# Patient Record
Sex: FEMALE | Race: WHITE | Employment: OTHER | URBAN - METROPOLITAN AREA
[De-identification: names, ages, dates, MRNs, and addresses within clinical notes are randomized per-mention and may not be internally consistent; named-entity substitution may affect disease eponyms.]

---

## 2021-01-05 ENCOUNTER — APPOINTMENT (OUTPATIENT)
Dept: CT IMAGING | Age: 86
End: 2021-01-05
Payer: MEDICARE

## 2021-01-05 ENCOUNTER — HOSPITAL ENCOUNTER (EMERGENCY)
Age: 86
Discharge: HOME | End: 2021-01-05
Attending: EMERGENCY MEDICINE
Payer: MEDICARE

## 2021-01-05 VITALS
HEART RATE: 66 BPM | OXYGEN SATURATION: 98 % | SYSTOLIC BLOOD PRESSURE: 162 MMHG | HEIGHT: 59 IN | DIASTOLIC BLOOD PRESSURE: 76 MMHG | RESPIRATION RATE: 16 BRPM | BODY MASS INDEX: 19.33 KG/M2 | WEIGHT: 95.9 LBS | TEMPERATURE: 97.9 F

## 2021-01-05 DIAGNOSIS — S01.81XA FACIAL LACERATION, INITIAL ENCOUNTER: Primary | ICD-10-CM

## 2021-01-05 DIAGNOSIS — R41.82 ALTERED MENTAL STATUS, UNSPECIFIED ALTERED MENTAL STATUS TYPE: ICD-10-CM
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
## 2021-01-05 LAB
GLUCOSE BLD-MCNC: 141 MG/DL (ref 70–99)
PERFORMED ON: ABNORMAL

## 2021-01-05 PROCEDURE — 70450 CT HEAD/BRAIN W/O DYE: CPT

## 2021-01-05 PROCEDURE — 12011 RPR F/E/E/N/L/M 2.5 CM/<: CPT

## 2021-01-05 PROCEDURE — 6370000000 HC RX 637 (ALT 250 FOR IP): Performed by: EMERGENCY MEDICINE

## 2021-01-05 PROCEDURE — 6360000002 HC RX W HCPCS: Performed by: EMERGENCY MEDICINE

## 2021-01-05 PROCEDURE — 99284 EMERGENCY DEPT VISIT MOD MDM: CPT

## 2021-01-05 PROCEDURE — 72125 CT NECK SPINE W/O DYE: CPT

## 2021-01-05 PROCEDURE — 90471 IMMUNIZATION ADMIN: CPT | Performed by: EMERGENCY MEDICINE

## 2021-01-05 PROCEDURE — 90715 TDAP VACCINE 7 YRS/> IM: CPT | Performed by: EMERGENCY MEDICINE

## 2021-01-05 RX ORDER — CEPHALEXIN 500 MG/1
500 CAPSULE ORAL 2 TIMES DAILY
Qty: 6 CAPSULE | Refills: 0 | Status: SHIPPED | OUTPATIENT
Start: 2021-01-05 | End: 2021-01-08

## 2021-01-05 RX ORDER — CEPHALEXIN 500 MG/1
500 CAPSULE ORAL ONCE
Status: COMPLETED | OUTPATIENT
Start: 2021-01-05 | End: 2021-01-05

## 2021-01-05 RX ADMIN — TETANUS TOXOID, REDUCED DIPHTHERIA TOXOID AND ACELLULAR PERTUSSIS VACCINE, ADSORBED 0.5 ML: 5; 2.5; 8; 8; 2.5 SUSPENSION INTRAMUSCULAR at 13:19

## 2021-01-05 RX ADMIN — CEPHALEXIN 500 MG: 500 CAPSULE ORAL at 13:18

## 2021-01-05 ASSESSMENT — PAIN DESCRIPTION - PAIN TYPE: TYPE: ACUTE PAIN

## 2021-01-05 ASSESSMENT — PAIN DESCRIPTION - DESCRIPTORS: DESCRIPTORS: ACHING

## 2021-01-05 ASSESSMENT — PAIN DESCRIPTION - ONSET: ONSET: SUDDEN

## 2021-01-05 ASSESSMENT — PAIN DESCRIPTION - FREQUENCY: FREQUENCY: CONTINUOUS

## 2021-01-05 ASSESSMENT — PAIN DESCRIPTION - LOCATION: LOCATION: HEAD

## 2021-01-05 ASSESSMENT — PAIN SCALES - GENERAL: PAINLEVEL_OUTOF10: 2

## 2021-01-05 NOTE — ED PROVIDER NOTES
The patient was seen primarily by Dr. Carolina Cruz, but I performed the laceration repair procedure as outlined below. Lac Repair    Date/Time: 1/5/2021 2:04 PM  Performed by: Allyssa Rashid PA-C  Authorized by: Charles Negrete MD     Consent:     Consent obtained:  Verbal    Consent given by:  Patient    Risks discussed:  Poor wound healing  Anesthesia (see MAR for exact dosages): Anesthesia method:  Local infiltration    Local anesthetic:  Lidocaine 2% WITH epi  Laceration details:     Location:  Face    Face location:  Forehead    Length (cm):  2  Repair type:     Repair type:  Simple  Pre-procedure details:     Preparation:  Patient was prepped and draped in usual sterile fashion  Exploration:     Hemostasis achieved with:  Direct pressure and epinephrine    Wound exploration: wound explored through full range of motion and entire depth of wound probed and visualized      Wound extent: no foreign bodies/material noted    Treatment:     Area cleansed with:  Saline    Amount of cleaning:  Standard  Skin repair:     Repair method:  Sutures    Suture size:  6-0    Suture material:  Nylon    Suture technique:  Simple interrupted    Number of sutures:  4  Approximation:     Approximation:  Close  Post-procedure details:     Dressing:  Sterile dressing    Patient tolerance of procedure:   Tolerated well, no immediate complications           Allyssa Rashid PA-C  01/05/21 4901

## 2021-01-05 NOTE — ED NOTES
Discharge and education instructions reviewed. Patient verbalized understanding, teach-back successful. Patient denied questions at this time. No acute distress noted. Patient instructed to follow-up as noted - return to emergency department if symptoms worsen. Patient verbalized understanding. Discharged per EDMD with discharge instructions.         Lennox Kamara RN  01/05/21 1557

## 2021-01-05 NOTE — ED NOTES
Pt to ED with her son who is also medical POA and caregiver s/p fall. Fall was sometime last night, unwitnessed, pt had several episodes of emesis this morning. Pt is a hospice pt, son states hospice nurse saw her this morning and recommended pt be evaluated in ED. Laceration and bruising lateral to right eye. Pt alert and oriented to person which is her baseline mental status per son.        Segun Natarajan RN  01/05/21 8499

## 2021-01-05 NOTE — ED PROVIDER NOTES
Triage Chief Complaint:   Fall (Pt to ED with her son s/p fall. Fall was sometime last night, unwitnessed, had several episodes of emesis this morning. Laceration and bruising lateral to right eye.  ), Head Injury, and Emesis    [place]:  Tonette Angelucci is a 80 y.o. female that presents with son for evaluation of fall with head strike which occurred yesterday and now the patient is vomiting. She is hospice and son/MDM Denver Radish is at bedside and states she is DNR but home nurse was concerned for internal brain bleeding from fall now that she vomited. He states that other than evaluation of this they want no further workup and would like to to be DC'd if no acute traumatic injury. She arrives alert awake oriented denying pain to any extremities even denying headache although she does have a lac to the side of her head    ROS:  At least 12 systems reviewed and otherwise acutely negative except as in the [address] Past Medical History:   Diagnosis Date    Arthritis      Past Surgical History:   Procedure Laterality Date    HIP ARTHROPLASTY      JOINT REPLACEMENT      bilateral hip     History reviewed. No pertinent family history. Social History     Socioeconomic History    Marital status:       Spouse name: Not on file    Number of children: 6    Years of education: Not on file    Highest education level: Not on file   Occupational History    Occupation: retired   Social Needs    Financial resource strain: Not on file    Food insecurity     Worry: Not on file     Inability: Not on file   [employer] needs     Medical: Not on file     Non-medical: Not on file   Tobacco Use    Smoking status: Never Smoker    Smokeless tobacco: Never Used   Substance and Sexual Activity    Alcohol use: No    Drug use: No    Sexual activity: Not Currently   Lifestyle    Physical activity     Days per week: Not on file     Minutes per session: Not on file    Stress: Not on file   Relationships  Social connections     Talks on phone: Not on file     Gets together: Not on file     Attends Temple service: Not on file     Active member of club or organization: Not on file     Attends meetings of clubs or organizations: Not on file     Relationship status: Not on file    Intimate partner violence     Fear of current or ex partner: Not on file     Emotionally abused: Not on file     Physically abused: Not on file     Forced sexual activity: Not on file   Other Topics Concern    Not on file   Social History Narrative    Not on file     No current facility-administered medications for this encounter. Current Outpatient Medications   Medication Sig Dispense Refill    cephALEXin (KEFLEX) 500 MG capsule Take 1 capsule by mouth 2 times daily for 3 days 6 capsule 0    amlodipine (NORVASC) 10 MG tablet       ARTHROTEC 75  MG-MCG per tablet       lisinopril (PRINIVIL;ZESTRIL) 5 MG tablet       ACTONEL 35 MG tablet       simvastatin (ZOCOR) 80 MG tablet       hydrocodone-acetaminophen (VICODIN) 5-500 MG per tablet Take 1 tablet by mouth every 6 hours as needed for Pain. 30 tablet 0    Diclofenac-Misoprostol (ARTHROTEC 75 PO) Take  by mouth 2 times daily.  AmLODIPine Besylate (NORVASC PO) Take  by mouth daily.  Multiple Minerals-Vitamins (PROSTEON PO) Take  by mouth daily.  Multiple Vitamin (MULTIVITAMIN PO) Take  by mouth daily.  UNKNOWN TO PATIENT daily. Water pill        No Known Allergies    [unfilled]    Nursing Notes Reviewed    Physical Exam:  Vitals:    01/05/21 1530   BP: (!) 162/76   Pulse: 66   Resp: 16   Temp: 97.9 °F (36.6 °C)   SpO2: 98%       Physical Exam   Constitutional: Patient is oriented to person, place, and time; appears well-developed and well-nourished. Non-toxic appearance. No distress.    HENT:   Head: Normocephalic, bruising and laceration to right temple Eyes: Conjunctivae and EOM are normal. Pupils are equal, round, and reactive to light. No scleral icterus. Neck: Full passive range of motion without pain. Neck supple. No spinous process tenderness and no muscular tenderness present. Cardiovascular: Normal rate and regular rhythm. Exam reveals no gallop and no friction rub. All extremities NVI  No murmur heard. Pulmonary/Chest: Effort normal and breath sounds normal. No respiratory distress. No crepitus to chest   Abdominal: Soft. Normal appearance. No distension and no mass. There is no tenderness. There is no rigidity. Musculoskeletal:        Right/Left shoulder: Normal.        Right/Left elbow: Normal.       Right/Left wrist: Normal.        Right/Left hip:Normal       Right/Left knee: Normal.        Right/Left ankle: Normal.        Right/Left foot: Normal.   Neurological: Alert and oriented to person, place, and time. Skin: Skin is warm and dry. No rash noted. Lac to right temple  Psychiatric: Normal mood and affect.        I have reviewed and interpreted all of the currently available lab results from this visit (if applicable):  Results for orders placed or performed during the hospital encounter of 01/05/21   POCT Glucose   Result Value Ref Range    POC Glucose 141 (H) 70 - 99 mg/dl    Performed on ACCU-CHEK         Radiographs (if obtained):  [] The following radiograph was interpreted by myself in the absence of a radiologist:  [x] Radiologist's Report Reviewed:    Narrative   EXAMINATION:   CT OF THE HEAD WITHOUT CONTRAST  1/5/2021 12:53 pm       TECHNIQUE:   CT of the head was performed without the administration of intravenous   contrast. Dose modulation, iterative reconstruction, and/or weight based   adjustment of the mA/kV was utilized to reduce the radiation dose to as low   as reasonably achievable.       COMPARISON:   None.       HISTORY:   ORDERING SYSTEM PROVIDED HISTORY: Altered mental status, unspecified altered mental status type   TECHNOLOGIST PROVIDED HISTORY:   Reason for exam:->fall,ams   Reason for exam:->no   Has a \"code stroke\" or \"stroke alert\" been called? ->No   Reason for Exam: [place] last night, ams   Acuity: Acute   Type of Exam: Initial       FINDINGS:   BRAIN/VENTRICLES: There is no acute intracranial hemorrhage, mass effect or   midline shift.  No abnormal extra-axial fluid collection.  The gray-white   differentiation is maintained without evidence of an acute infarct.  There is   no evidence of hydrocephalus. There are chronic microvascular ischemic   changes.  There is global atrophy.       ORBITS: The visualized portion of the orbits demonstrate no acute abnormality.       SINUSES: The visualized paranasal sinuses and mastoid air cells demonstrate   no acute abnormality.       SOFT TISSUES/SKULL:  No acute abnormality of the visualized skull or soft   tissues.           Impression   No acute intracranial abnormality.               Narrative   EXAMINATION:   CT OF THE CERVICAL SPINE WITHOUT CONTRAST 1/5/2021 12:53 pm       TECHNIQUE:   CT of the cervical spine was performed without the administration of   intravenous contrast. Multiplanar reformatted images are provided for review.    Dose modulation, iterative reconstruction, and/or weight based adjustment of   the mA/kV was utilized to reduce the radiation dose to as low as reasonably   achievable.       COMPARISON:   None.       HISTORY:   ORDERING SYSTEM PROVIDED HISTORY: Altered mental status, unspecified altered   mental status type   TECHNOLOGIST PROVIDED HISTORY:   Reason for exam:->fall   Reason for Exam: fell last night, neck pain and ams   Acuity: Acute   Type of Exam: Initial       FINDINGS:   BONES/ALIGNMENT: There is no acute fracture or traumatic malalignment.       DEGENERATIVE CHANGES: There is intervertebral disc space narrowing at all   cervical levels but most pronounced at C5-6 and C6-7.  There is 3 mm of Total Critical Care time was 0 minutes, excluding separately reportable procedures. There was a high probability of clinically significant/life threatening deterioration in the patient's condition which required my urgent intervention.       Clinical Impression:  Fall, laceration, head injury  (Please note that portions of this note may have been completed with a voice recognition program. Efforts were made to edit the dictations but occasionally words are mis-transcribed.)    MD Cassidy France MD  01/06/21 Zulay Silva

## 2023-02-02 ENCOUNTER — APPOINTMENT (OUTPATIENT)
Dept: CT IMAGING | Age: 88
End: 2023-02-02
Payer: MEDICARE

## 2023-02-02 ENCOUNTER — HOSPITAL ENCOUNTER (EMERGENCY)
Age: 88
Discharge: HOME | End: 2023-02-03
Payer: MEDICARE

## 2023-02-02 VITALS
TEMPERATURE: 98.1 F | RESPIRATION RATE: 21 BRPM | OXYGEN SATURATION: 98 % | HEART RATE: 71 BPM | HEIGHT: 62 IN | BODY MASS INDEX: 20.08 KG/M2 | WEIGHT: 109.13 LBS | DIASTOLIC BLOOD PRESSURE: 66 MMHG | SYSTOLIC BLOOD PRESSURE: 163 MMHG

## 2023-02-02 DIAGNOSIS — S09.90XA CLOSED HEAD INJURY, INITIAL ENCOUNTER: ICD-10-CM
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
## 2023-02-02 DIAGNOSIS — S00.01XA ABRASION OF SCALP, INITIAL ENCOUNTER: ICD-10-CM

## 2023-02-02 PROBLEM — E11.9 TYPE 2 DIABETES MELLITUS WITHOUT COMPLICATION, WITHOUT LONG-TERM CURRENT USE OF INSULIN (HCC): Status: ACTIVE | Noted: 2017-02-21

## 2023-02-02 PROBLEM — N18.30 CKD (CHRONIC KIDNEY DISEASE), SYMPTOM MANAGEMENT ONLY, STAGE 3 (MODERATE) (HCC): Status: ACTIVE | Noted: 2019-08-20

## 2023-02-02 PROCEDURE — 72125 CT NECK SPINE W/O DYE: CPT

## 2023-02-02 PROCEDURE — 99284 EMERGENCY DEPT VISIT MOD MDM: CPT

## 2023-02-02 PROCEDURE — 70450 CT HEAD/BRAIN W/O DYE: CPT

## 2023-02-02 RX ORDER — MISOPROSTOL 200 UG/1
TABLET ORAL
COMMUNITY
Start: 2023-01-16

## 2023-02-02 RX ORDER — POTASSIUM CHLORIDE 1500 MG/1
TABLET, FILM COATED, EXTENDED RELEASE ORAL
COMMUNITY
Start: 2023-01-27

## 2023-02-02 RX ORDER — HYDROCHLOROTHIAZIDE 25 MG/1
TABLET ORAL
COMMUNITY
Start: 2022-11-18

## 2023-02-02 ASSESSMENT — LIFESTYLE VARIABLES: HOW OFTEN DO YOU HAVE A DRINK CONTAINING ALCOHOL: NEVER

## 2023-02-02 ASSESSMENT — PAIN - FUNCTIONAL ASSESSMENT: PAIN_FUNCTIONAL_ASSESSMENT: NONE - DENIES PAIN

## 2023-02-03 NOTE — ED TRIAGE NOTES
Pt was walking around house and appears to have tripped over pants and fell. She hit her head and has a laceration/hematoma on the back of her head. She denies loosing consciousness.

## 2023-02-03 NOTE — ED PROVIDER NOTES
[address]  [address] [address]  Dept: [phone]  Loc: [address] ENCOUNTER        This patient was not seen or evaluated by the attending physician. I evaluated this patient, the attending physician was available for consultation. CHIEF COMPLAINT    Chief Complaint   Patient presents with    Fall     Pt was walking around house and appears to have tripped over pants and fell. She hit her head and has a laceration/hematoma on the back of her head. She denies loosing consciousness. HPI    Perry Remy is a 80 y.o. female who presents with head injury with associated hematoma and abrasion/ laceration. The mechanism of the injury was that she had to go to the bathroom, tripped over her pants and fell. This occurred shortly prior to arrival.  Associated bleeding was alleviated by pressure. The pain is 0/10 in severity. There was no associated loss of consciousness. The patient had no episodes of vomiting after the injury. The patient has no associated neck pain. The patient is not currently on anticoagulants. REVIEW OF SYSTEMS    Neurologic: No extremity pain or weakness  Cardiac: No chest pain or chest injury  Respiratory: No difficulty breathing  GI: No abdominal pain or abdominal Injury  Skin: see HPI  Immunization: Tetanus status unknown, I looked in care everywhere, was updated in 2021, will not need to be updated during this ED encounter  All other systems reviewed and are negative.     PAST MEDICAL & SURGICAL HISTORY    Past Medical History:   Diagnosis Date    Arthritis      Past Surgical History:   Procedure Laterality Date    HIP ARTHROPLASTY      JOINT REPLACEMENT      bilateral hip       CURRENT MEDICATIONS    Current Outpatient Rx   Medication Sig Dispense Refill    hydroCHLOROthiazide (HYDRODIURIL) 25 MG tablet       miSOPROStol (CYTOTEC) 200 MCG tablet potassium chloride (KLOR-CON M) 20 MEQ TBCR extended release tablet       amlodipine (NORVASC) 10 MG tablet       ARTHROTEC 75  MG-MCG per tablet       lisinopril (PRINIVIL;ZESTRIL) 5 MG tablet       ACTONEL 35 MG tablet       simvastatin (ZOCOR) 80 MG tablet       hydrocodone-acetaminophen (VICODIN) 5-500 MG per tablet Take 1 tablet by mouth every 6 hours as needed for Pain. 30 tablet 0    Diclofenac-Misoprostol (ARTHROTEC 75 PO) Take  by mouth 2 times daily. AmLODIPine Besylate (NORVASC PO) Take  by mouth daily. Multiple Minerals-Vitamins (PROSTEON PO) Take  by mouth daily. Multiple Vitamin (MULTIVITAMIN PO) Take  by mouth daily. UNKNOWN TO PATIENT daily. Water pill          ALLERGIES    No Known Allergies    SOCIAL & FAMILY HISTORY    Social History     Socioeconomic History    Marital status:      Spouse name: None    Number of children: 11    Years of education: None    Highest education level: None   Occupational History    Occupation: retired   Tobacco Use    Smoking status: Never    Smokeless tobacco: Never   Substance and Sexual Activity    Alcohol use: No    Drug use: No    Sexual activity: Not Currently     History reviewed. No pertinent family history.     PHYSICAL EXAM    VITAL SIGNS: BP (!) 156/68   Pulse 65   Temp 98.1 °F (36.7 °C) (Oral)   Resp 17   Ht 5' 2\" (1.575 m)   Wt 109 lb 2 oz (49.5 kg)   SpO2 100%   BMI 19.96 kg/m²   Constitutional:  Well developed, well nourished, no acute distress  Scalp: see integument, +posterior scalp hematoma with overlying abrasion  Neck: no posterior midline neck tenderness, no JVD   Eyes: Pupils equal, round and reactive to light, extraocular movement are intact  HENT:  No trismus, airway patent, no hemotympanum  Respiratory:  Lungs clear to auscultation bilaterally, no retractions   Cardiovascular:  Regular rate, no murmurs  GI:  Soft, nontender, normal bowel sounds  Musculoskeletal:  No edema, no acute deformities  Integument:  +abrasion the depth does not reach down to the subcutaneous tissue, there is no foreign body in the wound  Neurologic:  Awake, alert, oriented x3, no aphasia,no slurred speech, CN II-XII intact, normal finger to nose test bilaterally, equal strength in all 4 extremities, sensation to light touch intact bilaterally  Psych: Pleasant affect, no hallucinations    RADIOLOGY  CT CSpine W/O Contrast   Preliminary Result   No acute intracranial abnormality. Age-appropriate atrophy and small-vessel   disease ischemic changes. Right occipital mild scalp hematoma but no   underlying skull fractures. Redemonstration of frontal hyperostosis. Cervical spine demonstrates severe multilevel degenerative disc disease which   is similar in appearance compared to January 5, 2021. No acute traumatic   cervical malalignment. CT Head W/O Contrast   Preliminary Result   No acute intracranial abnormality. Age-appropriate atrophy and small-vessel   disease ischemic changes. Right occipital mild scalp hematoma but no   underlying skull fractures. Redemonstration of frontal hyperostosis. Cervical spine demonstrates severe multilevel degenerative disc disease which   is similar in appearance compared to January 5, 2021. No acute traumatic   cervical malalignment.                  ED COURSE & MEDICAL DECISION MAKING    See chart for details of any medications ordered    History from : Patient    Limitations to history : None    Chronic Conditions: CKD, frequent falls, hypertension, osteoarthrosis, osteoporosis, type 2 diabetes, spinal stenosis, rotator cuff sprain, history of total hip arthroplasty    CONSULTS: (Who and What was discussed)  None    Discussion with Other Profesionals : None    Social Determinants : None    Records Reviewed : None    CC/HPI Summary, DDx, ED Course, and Reassessment: See HPI and above for full presentation and physical exam.    Patient is a very pleasant 8-year-old female. Lives at home alone but she has in-home nursing care twice a day, also has children living next-door with cameras installed in the house keep an eye on the patient. She tripped trying to go to the bathroom pulling her pants down. Did hit her head. Did not lose consciousness. Has no complaints on arrival.  No diplopia or visual changes. Does not think that she is on any anticoagulants. No associated neck pain. Came to the ED via EMS for further evaluation and treatment. On arrival she is afebrile and hemodynamically stable. Nontoxic appearance. Does have a hematoma to the posterior scalp, with associated abrasion but no laceration. Is not actively bleeding. No hemotympanum bilaterally. No cagle signs or raccoon eyes. Is answering all questions appropriately. Denies any neck pain, and has no midline bony spine tenderness. Full active and passive range of motion of all 4 extremities and of her cervical spine without any pain. Sensation to light touch intact in all 4 extremities. No pain with rocking of the hips or full abduction or abduction of the hips. No pain of the shoulders. No deformities in any extremities. Given her age we will obtain a CT head and cervical spine and reevaluate    Differential diagnosis: Neurologic injury, vascular injury, involvement of bone that could lead to osteomyelitis, foreign body left in the wound, delayed bacterial skin infection, Epidural Hematoma, Subdural Hematoma, Parenchymal Brain contusion or bleed, Subarachnoid hemorrhage, Skull Fracture, Neck Fracture or Dislocation, other. Neuro exam unremarkable. CTs as read by the radiologist as above. On reevaluation of the patient and her children are at bedside. Explained that it was mechanical fall, was not down for very long as the nursing staff just left for the day. She got up, ate her food . and then they called EMS to be transported here for further evaluation.   She is not on anticoagulants.  She remains neurologically intact, and at her baseline.  Has remained afebrile and hemodynamically stable throughout her entire ED course and will be discharged home in stable condition.  Is to follow-up with a PCP on an outpatient basis or return immediately for any new or worsening symptoms.  Her and her children at bedside verbalized understanding, they have no further questions or concerns and will be discharged home in stable condition    I estimate there is LOW risk for SUBARACHNOID HEMORRHAGE, MENINGITIS, INTRACRANIAL HEMORRHAGE, SUBDURAL HEMATOMA, OR STROKE, thus I consider the discharge disposition reasonable. Sandra Martinez and I have discussed the diagnosis and risks, and we agree with discharging home to follow-up with their primary doctor. We also discussed returning to the Emergency Department immediately if new or worsening symptoms occur. We have discussed the symptoms which are most concerning (e.g., changing or worsening pain, weakness, vomiting, fever) that necessitate immediate return.    Discharge Vital Signs:  Blood pressure (!) 156/68, pulse 65, temperature 98.1 °F (36.7 °C), temperature source Oral, resp. rate 17, height 5' 2\" (1.575 m), weight 109 lb 2 oz (49.5 kg), SpO2 100 %, not currently breastfeeding.     The patient was instructed to follow up as an outpatient in 1-3 days.  The patient was instructed to return to the ED immediately for any new or worsening symptoms.  The patient verbalized understanding.      Disposition Considerations (Tests not ordered but considered, Shared Decision Making, Pt Expectation of Test or Tx.):       I am the Primary Clinician of Record.    FINAL IMPRESSION    1. Fall, initial encounter    2. Closed head injury, initial encounter    3. Abrasion of scalp, initial encounter        PLAN  Discharge with outpatient follow-up (see EMR)      (Please note that this note was completed with a voice recognition program.  Every attempt was made to  edit the dictations, but inevitably there remain words that are mis-transcribed.)         Tom Fuentes, CARLYLE - STEVEN  02/02/23 1958

## 2023-02-03 NOTE — ED NOTES
Provider order placed for patient's discharge. Provider reviewed decision to discharge with the patient. Discharge paperwork and any prescriptions were reviewed with the patient. Patient verbalized understanding of discharge education and any prescriptions and has no further questions or further needs at this time. Patient left with all personal belongings and was stable upon departure. Patient thanked for choosing [facility] ([facility]) and informed to return should any need arise.        Felix Allen RN  02/03/23 5734

## 2023-02-03 NOTE — ED NOTES
This RN completed wound care but pt refused a dressing on wound site. This RN acknowledged the pt of the order for a wound dressing to be applied, but pt still refused.       Lois Fernandez RN  02/02/23 3942 Fellow: Edwin Amaya